# Patient Record
Sex: FEMALE
[De-identification: names, ages, dates, MRNs, and addresses within clinical notes are randomized per-mention and may not be internally consistent; named-entity substitution may affect disease eponyms.]

---

## 2019-03-11 NOTE — PROCEDURE NOTE: CLINICAL
PROCEDURE NOTE: Punctal Plugs, Silicone #1 OS. Diagnosis: Herpesviral Keratitis. Anesthesia: Topical. Prior to treatment, the risks/benefits/alternatives were discussed. The patient wished to proceed with procedure. Permanent silicone plugs were inserted. Size/location of plugs inserted:LLL 0.6MM. Patient tolerated procedure well. There were no complications. Post procedure instructions given. Eleanor Thakkar

## 2019-04-08 NOTE — PATIENT DISCUSSION
Improved Substantially since last visit. Not active today. Recommend follow up in 1 to 3 months in [place]. Continue taking Acyclovir 400mg 2x a day for 3 to 4 months.

## 2019-04-08 NOTE — PROCEDURE NOTE: CLINICAL
PROCEDURE NOTE: Punctal Plugs, Silicone OS. Diagnosis: Herpesviral Keratitis. Prior to treatment, the risks/benefits/alternatives were discussed. The patient wished to proceed with procedure. Permanent silicone plugs were inserted. Size/location of plugs inserted: LLL 0.7MM. Patient tolerated procedure well. There were no complications. Post procedure instructions given. Caio Bradford

## 2019-04-08 NOTE — PATIENT DISCUSSION
Pt going back up [facility] for the summer and will see Dr. Jerrod Holbrook in [place], [place]. Faxed letter to Dr. Lalo Mcmillan.

## 2021-05-10 NOTE — PATIENT DISCUSSION
Cataract symptoms i.e., glare, blur discussed. Pt to call if worsening vision or trouble with driving, TV, reading, ADL. UV precautions. Reviewed possibility of future cataract surgery.
Contagion risk and importance of hand washing discussed.
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 Etiology. ? HSV vs other. If no improvement at next visit +/- Biopsy and Blood Work.
Pred Forte-use 2x a day x 5 days then stop, Stop Benny El. Continue taking Acyclovir 400mg 2x a day until further notice by Dr. Thi Jaimes. Use Preservative Free Artificial Tears 4x a day. Start Taking Vitamin C 1000 mg 2x a day.
Rec Standard Plug in LLL today without comps.
Resolved, discontinue lotemax drop OU.
Stable.
complains of pain/discomfort

## 2021-08-30 ENCOUNTER — NEW PATIENT ROUTINE/VISION (OUTPATIENT)
Dept: URBAN - METROPOLITAN AREA CLINIC 48 | Facility: CLINIC | Age: 31
End: 2021-08-30

## 2021-08-30 DIAGNOSIS — H04.123: ICD-10-CM

## 2021-08-30 DIAGNOSIS — Z01.00: ICD-10-CM

## 2021-08-30 PROCEDURE — 92015 DETERMINE REFRACTIVE STATE: CPT

## 2021-08-30 PROCEDURE — 92004 COMPRE OPH EXAM NEW PT 1/>: CPT

## 2021-08-30 ASSESSMENT — VISUAL ACUITY
OD_CC: 20/20
OS_CC: 20/20
OU_SC: 20/40
OU_SC: J1+
OU_CC: 20/20
OS_SC: J1+
OS_SC: 20/40
OD_SC: 20/50+2
OD_SC: J1+

## 2021-08-30 ASSESSMENT — KERATOMETRY
OD_K2POWER_DIOPTERS: 45.50
OS_K1POWER_DIOPTERS: 46.25
OD_K1POWER_DIOPTERS: 46.25
OS_AXISANGLE_DEGREES: 110
OS_K2POWER_DIOPTERS: 45.50
OS_AXISANGLE2_DEGREES: 20
OD_AXISANGLE_DEGREES: 100
OD_AXISANGLE2_DEGREES: 10

## 2021-08-30 ASSESSMENT — TONOMETRY
OS_IOP_MMHG: 18
OD_IOP_MMHG: 18

## 2021-08-30 NOTE — PATIENT DISCUSSION
Patient did not bring current CLs with her.  She states they are Dailies.  Patient advised to bring current CLs to her CL fit appointment .

## 2021-09-20 ENCOUNTER — CONTACT LENS FITTING (OUTPATIENT)
Dept: URBAN - METROPOLITAN AREA CLINIC 48 | Facility: CLINIC | Age: 31
End: 2021-09-20

## 2021-09-20 DIAGNOSIS — H52.13: ICD-10-CM

## 2021-09-20 DIAGNOSIS — H04.123: ICD-10-CM

## 2021-09-20 PROCEDURE — CLF EW SOF CL FIT, EW, SOFT, SPH

## 2021-09-20 ASSESSMENT — VISUAL ACUITY
OD_SC: 20/30
OS_SC: 20/30-2
OU_SC: 20/20-1

## 2021-09-20 ASSESSMENT — KERATOMETRY
OD_AXISANGLE_DEGREES: 100
OS_AXISANGLE_DEGREES: 110
OS_K1POWER_DIOPTERS: 46.25
OS_K2POWER_DIOPTERS: 45.50
OD_K2POWER_DIOPTERS: 45.50
OD_K1POWER_DIOPTERS: 46.25
OD_AXISANGLE2_DEGREES: 10
OS_AXISANGLE2_DEGREES: 20

## 2021-09-20 NOTE — PATIENT DISCUSSION
Discussed the importance of CL hygiene.  CL trials are daily replacement. Patient instructed not to sleep, swim, or shower in CLs.

## 2021-09-20 NOTE — PATIENT DISCUSSION
If patient does not prefer comfort of either CL trials, will have patient   Acuvue Oasys 1 Day trials (none in stock at [facility] office today).

## 2022-11-09 ENCOUNTER — COMPREHENSIVE EXAM (OUTPATIENT)
Dept: URBAN - METROPOLITAN AREA CLINIC 48 | Facility: LOCATION | Age: 32
End: 2022-11-09

## 2022-11-09 DIAGNOSIS — Z01.00: ICD-10-CM

## 2022-11-09 DIAGNOSIS — H52.13: ICD-10-CM

## 2022-11-09 PROCEDURE — 92015 DETERMINE REFRACTIVE STATE: CPT

## 2022-11-09 PROCEDURE — 92014 COMPRE OPH EXAM EST PT 1/>: CPT

## 2022-11-09 PROCEDURE — 92310-1E ESTABLISHED CL PATIENT SPHERICAL SINGLE VISION SOFT LENS EVALUATION

## 2022-11-09 ASSESSMENT — KERATOMETRY
OD_AXISANGLE2_DEGREES: 95
OD_K1POWER_DIOPTERS: 45.50
OD_K2POWER_DIOPTERS: 46.25
OS_K1POWER_DIOPTERS: 45.75
OS_K2POWER_DIOPTERS: 46.25
OS_AXISANGLE2_DEGREES: 105
OD_AXISANGLE_DEGREES: 5
OS_AXISANGLE_DEGREES: 15

## 2022-11-09 ASSESSMENT — VISUAL ACUITY
OS_SC: 20/60-1
OD_CC: 20/20-1
OU_SC: J1+ @16IN
OD_SC: 20/30-2
OS_CC: 20/20-1
OD_PH: 20/25

## 2022-11-09 ASSESSMENT — TONOMETRY
OD_IOP_MMHG: 16
OS_IOP_MMHG: 15